# Patient Record
Sex: FEMALE | Race: WHITE | NOT HISPANIC OR LATINO | Employment: FULL TIME | URBAN - METROPOLITAN AREA
[De-identification: names, ages, dates, MRNs, and addresses within clinical notes are randomized per-mention and may not be internally consistent; named-entity substitution may affect disease eponyms.]

---

## 2018-05-30 ENCOUNTER — OFFICE VISIT (OUTPATIENT)
Dept: GASTROENTEROLOGY | Facility: MEDICAL CENTER | Age: 53
End: 2018-05-30

## 2018-05-30 VITALS
HEART RATE: 80 BPM | BODY MASS INDEX: 23.64 KG/M2 | WEIGHT: 156 LBS | HEIGHT: 68 IN | TEMPERATURE: 97.2 F | DIASTOLIC BLOOD PRESSURE: 80 MMHG | SYSTOLIC BLOOD PRESSURE: 140 MMHG

## 2018-05-30 DIAGNOSIS — K21.9 CHRONIC GERD: ICD-10-CM

## 2018-05-30 DIAGNOSIS — K62.5 RECTAL BLEEDING: Primary | ICD-10-CM

## 2018-05-30 PROCEDURE — 99244 OFF/OP CNSLTJ NEW/EST MOD 40: CPT | Performed by: INTERNAL MEDICINE

## 2018-05-30 RX ORDER — ASPIRIN 81 MG/1
81 TABLET ORAL DAILY
Status: ON HOLD | COMMUNITY
End: 2018-06-13 | Stop reason: ALTCHOICE

## 2018-05-30 RX ORDER — OMEPRAZOLE 40 MG/1
40 CAPSULE, DELAYED RELEASE ORAL DAILY
Qty: 30 CAPSULE | Refills: 2 | Status: SHIPPED | OUTPATIENT
Start: 2018-05-30

## 2018-05-30 RX ORDER — ATORVASTATIN CALCIUM 40 MG/1
40 TABLET, FILM COATED ORAL DAILY
COMMUNITY

## 2018-05-30 NOTE — LETTER
May 30, 2018     Referral [address] [place] [id number]    Patient: Rosalba Duran   YOB: 1965   Date of Visit: 5/30/2018       Dear Dr Rosa Bond:    Thank you for referring Rosalba Duran to me for evaluation  Below are my notes for this consultation  If you have questions, please do not hesitate to call me  I look forward to following your patient along with you  Sincerely,        Victorina Alejandre MD        CC: MD Victorina Womack MD  5/30/2018 11:34 AM  Sign at close encounter  [facility] - Outpatient Consultation  Rosalba Duran 47 y o  female MRN: [id number]  Encounter: [id number]          ASSESSMENT AND PLAN:      1  Rectal bleeding - likely secondary to hemorrhoids  Given her age and change in stool caliber, I recommend colonoscopy to rule out underlying malignancy and stricture  We reviewed high fiber diet  Avoid constipation  Follow up after her colonoscopy  2  GERD - She has reflux symptoms and decreased appetite  She is on ASA  Concern for NSAIDs induced gastritis and PUD  Trial of Omeprazole 40 mg po daily  Take 30 min before breakfast  If her symptoms persists consider EGD       ______________________________________________________________________    HPI:  47year old female with history of CAD s/p stent placement in 2012 here for evaluation of rectal bleeding  She reports intermittent bright red blood per rectum for 1 year  Over the last few months she noted increased in frequency to daily bleeding  She also reports having hemorrhoids and the blood is usually around the stool  Her stool consistency has also changed to thin caliber with sense of incomplete evacuation  She has bloating and discomfort in upper mid abdomen  She has intermittent reflux symptoms     Family history of GI malignancy none  Prior colonoscopy none  Prior EGD none  Prior abdominal surgery none    REVIEW OF SYSTEMS:    CONSTITUTIONAL: Denies any fever, chills, rigors, and weight loss  HEENT: No earache or tinnitus  Denies hearing loss or visual disturbances  CARDIOVASCULAR: No chest pain or palpitations  RESPIRATORY: Denies any cough, hemoptysis, shortness of breath or dyspnea on exertion  GASTROINTESTINAL: As noted in the History of Present Illness  GENITOURINARY: No problems with urination  Denies any hematuria or dysuria  NEUROLOGIC: No dizziness or vertigo, denies headaches  MUSCULOSKELETAL: Denies any muscle or joint pain  SKIN: Denies skin rashes or itching  ENDOCRINE: Denies excessive thirst  Denies intolerance to heat or cold  PSYCHOSOCIAL: Denies depression or anxiety  Denies any recent memory loss  Historical Information   Past Medical History:   Diagnosis Date    Anemia     Arthritis     GERD (gastroesophageal reflux disease)     Heart disease     Hyperlipidemia     Hypertension     Thyroid disease      Past Surgical History:   Procedure Laterality Date    CARDIAC SURGERY      TUBAL LIGATION       Social History   History   Alcohol Use    Yes     History   Drug Use No     History   Smoking Status    Never Smoker   Smokeless Tobacco    Never Used     Family History   Problem Relation Age of Onset    Heart disease Mother     Heart disease Father     Ulcerative colitis Sister     Ulcerative colitis Brother        Meds/Allergies       Current Outpatient Prescriptions:     aspirin (ECOTRIN LOW STRENGTH) 81 mg EC tablet    atorvastatin (LIPITOR) 40 mg tablet    metoprolol tartrate (LOPRESSOR) 25 mg tablet    Allergies   Allergen Reactions    Codeine            Objective     Blood pressure 140/80, pulse 80, temperature (!) 97 2 °F (36 2 °C), temperature source Tympanic, height 5' 8" (1 727 m), weight 70 8 kg (156 lb)          PHYSICAL EXAM:      General Appearance:   Alert, cooperative, no distress   HEENT:   Normocephalic, atraumatic, anicteric      Neck:  Supple, symmetrical, trachea midline   Lungs:   Clear to auscultation bilaterally; no rales, rhonchi or wheezing; respirations unlabored    Heart[de-identified]   Regular rate and rhythm; no murmur, rub, or gallop     Abdomen:   Soft,mild diffuse tenderness non-distended; normal bowel sounds; no masses, no organomegaly    Genitalia:   Deferred    Rectal:   Deferred - will be done during her colonoscopy   Extremities:  No cyanosis, clubbing or edema    Pulses:  2+ and symmetric    Skin:  No jaundice, rashes, or lesions    Lymph nodes:  No palpable cervical lymphadenopathy        Lab Results:  No [facility] labs

## 2018-05-30 NOTE — PROGRESS NOTES
[facility] - Outpatient Consultation  Bryan Wu 47 y o  female MRN: [id number]  Encounter: [id number]          ASSESSMENT AND PLAN:      1  Rectal bleeding - likely secondary to hemorrhoids  Given her age and change in stool caliber, I recommend colonoscopy to rule out underlying malignancy and stricture  We reviewed high fiber diet  Avoid constipation  Follow up after her colonoscopy  2  GERD - She has reflux symptoms and decreased appetite  She is on ASA  Concern for NSAIDs induced gastritis and PUD  Trial of Omeprazole 40 mg po daily  Take 30 min before breakfast  If her symptoms persists consider EGD       ______________________________________________________________________    HPI:  47year old female with history of CAD s/p stent placement in 2012 here for evaluation of rectal bleeding  She reports intermittent bright red blood per rectum for 1 year  Over the last few months she noted increased in frequency to daily bleeding  She also reports having hemorrhoids and the blood is usually around the stool  Her stool consistency has also changed to thin caliber with sense of incomplete evacuation  She has bloating and discomfort in upper mid abdomen  She has intermittent reflux symptoms  Family history of GI malignancy none  Prior colonoscopy none  Prior EGD none  Prior abdominal surgery none    REVIEW OF SYSTEMS:    CONSTITUTIONAL: Denies any fever, chills, rigors, and weight loss  HEENT: No earache or tinnitus  Denies hearing loss or visual disturbances  CARDIOVASCULAR: No chest pain or palpitations  RESPIRATORY: Denies any cough, hemoptysis, shortness of breath or dyspnea on exertion  GASTROINTESTINAL: As noted in the History of Present Illness  GENITOURINARY: No problems with urination  Denies any hematuria or dysuria  NEUROLOGIC: No dizziness or vertigo, denies headaches  MUSCULOSKELETAL: Denies any muscle or joint pain     SKIN: Denies skin rashes or itching  ENDOCRINE: Denies excessive thirst  Denies intolerance to heat or cold  PSYCHOSOCIAL: Denies depression or anxiety  Denies any recent memory loss  Historical Information   Past Medical History:   Diagnosis Date    Anemia     Arthritis     GERD (gastroesophageal reflux disease)     Heart disease     Hyperlipidemia     Hypertension     Thyroid disease      Past Surgical History:   Procedure Laterality Date    CARDIAC SURGERY      TUBAL LIGATION       Social History   History   Alcohol Use    Yes     History   Drug Use No     History   Smoking Status    Never Smoker   Smokeless Tobacco    Never Used     Family History   Problem Relation Age of Onset    Heart disease Mother     Heart disease Father     Ulcerative colitis Sister     Ulcerative colitis Brother        Meds/Allergies       Current Outpatient Prescriptions:     aspirin (ECOTRIN LOW STRENGTH) 81 mg EC tablet    atorvastatin (LIPITOR) 40 mg tablet    metoprolol tartrate (LOPRESSOR) 25 mg tablet    Allergies   Allergen Reactions    Codeine            Objective     Blood pressure 140/80, pulse 80, temperature (!) 97 2 °F (36 2 °C), temperature source Tympanic, height 5' 8" (1 727 m), weight 70 8 kg (156 lb)  PHYSICAL EXAM:      General Appearance:   Alert, cooperative, no distress   HEENT:   Normocephalic, atraumatic, anicteric      Neck:  Supple, symmetrical, trachea midline   Lungs:   Clear to auscultation bilaterally; no rales, rhonchi or wheezing; respirations unlabored    Heart[de-identified]   Regular rate and rhythm; no murmur, rub, or gallop     Abdomen:   Soft,mild diffuse tenderness non-distended; normal bowel sounds; no masses, no organomegaly    Genitalia:   Deferred    Rectal:   Deferred - will be done during her colonoscopy   Extremities:  No cyanosis, clubbing or edema    Pulses:  2+ and symmetric    Skin:  No jaundice, rashes, or lesions    Lymph nodes:  No palpable cervical lymphadenopathy        Lab Results:  No [facility] labs

## 2018-05-30 NOTE — PATIENT INSTRUCTIONS
Colonoscopy scheduled on 6/13/2018 (ok per Anson Chahal from [facility]) with Dr Lisbeth Peacock at the [facility]  Guillermina Mccarthy sample/ instructions giving to patient

## 2018-06-13 ENCOUNTER — HOSPITAL ENCOUNTER (OUTPATIENT)
Facility: HOSPITAL | Age: 53
Setting detail: OUTPATIENT SURGERY
Discharge: HOME | End: 2018-06-13
Attending: INTERNAL MEDICINE | Admitting: INTERNAL MEDICINE
Payer: COMMERCIAL

## 2018-06-13 ENCOUNTER — ANESTHESIA EVENT (OUTPATIENT)
Dept: GASTROENTEROLOGY | Facility: HOSPITAL | Age: 53
End: 2018-06-13
Payer: COMMERCIAL

## 2018-06-13 ENCOUNTER — ANESTHESIA (OUTPATIENT)
Dept: GASTROENTEROLOGY | Facility: HOSPITAL | Age: 53
End: 2018-06-13
Payer: COMMERCIAL

## 2018-06-13 VITALS
TEMPERATURE: 98 F | WEIGHT: 156 LBS | RESPIRATION RATE: 20 BRPM | DIASTOLIC BLOOD PRESSURE: 60 MMHG | OXYGEN SATURATION: 95 % | BODY MASS INDEX: 23.64 KG/M2 | SYSTOLIC BLOOD PRESSURE: 119 MMHG | HEART RATE: 69 BPM | HEIGHT: 68 IN

## 2018-06-13 DIAGNOSIS — K62.5 RECTAL BLEEDING: ICD-10-CM

## 2018-06-13 PROCEDURE — 45380 COLONOSCOPY AND BIOPSY: CPT | Performed by: INTERNAL MEDICINE

## 2018-06-13 PROCEDURE — 88305 TISSUE EXAM BY PATHOLOGIST: CPT | Performed by: PATHOLOGY

## 2018-06-13 RX ORDER — PROPOFOL 10 MG/ML
INJECTION, EMULSION INTRAVENOUS AS NEEDED
Status: DISCONTINUED | OUTPATIENT
Start: 2018-06-13 | End: 2018-06-13 | Stop reason: SURG

## 2018-06-13 RX ORDER — ASPIRIN 325 MG
325 TABLET ORAL DAILY
COMMUNITY

## 2018-06-13 RX ORDER — SODIUM CHLORIDE 9 MG/ML
50 INJECTION, SOLUTION INTRAVENOUS CONTINUOUS
Status: DISCONTINUED | OUTPATIENT
Start: 2018-06-13 | End: 2018-06-13 | Stop reason: HOSPADM

## 2018-06-13 RX ORDER — LIDOCAINE HYDROCHLORIDE 20 MG/ML
INJECTION, SOLUTION EPIDURAL; INFILTRATION; INTRACAUDAL; PERINEURAL AS NEEDED
Status: DISCONTINUED | OUTPATIENT
Start: 2018-06-13 | End: 2018-06-13 | Stop reason: SURG

## 2018-06-13 RX ADMIN — PROPOFOL 80 MG: 10 INJECTION, EMULSION INTRAVENOUS at 11:33

## 2018-06-13 RX ADMIN — PROPOFOL 50 MG: 10 INJECTION, EMULSION INTRAVENOUS at 11:38

## 2018-06-13 RX ADMIN — PROPOFOL 40 MG: 10 INJECTION, EMULSION INTRAVENOUS at 11:42

## 2018-06-13 RX ADMIN — PROPOFOL 40 MG: 10 INJECTION, EMULSION INTRAVENOUS at 11:40

## 2018-06-13 RX ADMIN — LIDOCAINE HYDROCHLORIDE 60 MG: 20 INJECTION, SOLUTION EPIDURAL; INFILTRATION; INTRACAUDAL; PERINEURAL at 11:33

## 2018-06-13 RX ADMIN — SODIUM CHLORIDE 50 ML/HR: 0.9 INJECTION, SOLUTION INTRAVENOUS at 11:04

## 2018-06-13 RX ADMIN — PROPOFOL 60 MG: 10 INJECTION, EMULSION INTRAVENOUS at 11:36

## 2018-06-13 RX ADMIN — PROPOFOL 40 MG: 10 INJECTION, EMULSION INTRAVENOUS at 11:35

## 2018-06-13 RX ADMIN — PROPOFOL 30 MG: 10 INJECTION, EMULSION INTRAVENOUS at 11:44

## 2018-06-13 NOTE — ANESTHESIA PREPROCEDURE EVALUATION
Review of Systems/Medical History  Patient summary reviewed  Chart reviewed  No history of anesthetic complications     Cardiovascular  Hyperlipidemia, Hypertension , Past MI , CAD (reports patent stent and non-obs CAD on f/u cath 2016) , Cardiac stents  No CHF ,    Pulmonary  Not a smoker , No asthma , No recent URI ,        GI/Hepatic    GERD ,        Kidney stones,        Endo/Other  History of thyroid disease , hypothyroidism,      GYN       Hematology  Anemia ,     Musculoskeletal    Arthritis     Neurology   Psychology           Physical Exam    Airway    Mallampati score: II  TM Distance: >3 FB       Dental       Cardiovascular      Pulmonary      Other Findings        Anesthesia Plan  ASA Score- 3     Anesthesia Type- IV sedation with anesthesia with ASA Monitors  Additional Monitors:   Airway Plan:     Comment: SENG Yeh , have personally seen and evaluated the patient prior to anesthetic care  I have reviewed the pre-anesthetic record, and other medical records if appropriate to the anesthetic care  If a CRNA is involved in the case, I have reviewed the CRNA assessment, if present, and agree  Risks/benefits and alternatives discussed with patient including possible PONV, sore throat, and possibility of rare anesthetic and surgical emergencies        Plan Factors- Patient instructed to abstain from smoking on day of procedure  Patient did not smoke on day of surgery  Induction-     Postoperative Plan-     Informed Consent- Anesthetic plan and risks discussed with patient  I personally reviewed this patient with the CRNA  Discussed and agreed on the Anesthesia Plan with the CRNA  Keely Sanderson

## 2018-06-13 NOTE — ANESTHESIA POSTPROCEDURE EVALUATION
Post-Op Assessment Note      CV Status:  Stable    Mental Status:  Alert and somnolent    Hydration Status:  Euvolemic    PONV Controlled:  Controlled    Airway Patency:  Patent    Post Op Vitals Reviewed: Yes          Staff: Anesthesiologist       Comments: vss, unobstructed respiratory pattern          BP      Temp      Pulse     Resp      SpO2

## 2018-06-13 NOTE — OP NOTE
**** GI/ENDOSCOPY REPORT ****     PATIENT NAME: FAYE DUMONT ------ VISIT ID:  Patient ID:   [id number] YOB: 1965     INTRODUCTION: Colonoscopy - A 47 female patient presents for an outpatient   Colonoscopy at [address] [place] [address]  PREVIOUS COLONOSCOPY: No prior colonoscopy  INDICATIONS: Rectal bleeding  CONSENT:  The benefits, risks, and alternatives to the procedure were   discussed and informed consent was obtained from the patient  PREPARATION: EKG, pulse, pulse oximetry and blood pressure were monitored   throughout the procedure  The patient was identified by myself both   verbally and by visual inspection of ID band  Airway Assessment   Classification: Airway class 2 - Visualization of the soft palate, fauces   and uvula  ASA Classification: Class 2 - Patient has mild to moderate   systemic disturbance that may or may not be related to the disorder   requiring surgery  MEDICATIONS: Anesthesia-check records     PROCEDURE:  The endoscope was passed without difficulty through the anus   under direct visualization and advanced to the cecum, confirmed by   appendiceal orifice and ileocecal valve  The scope was withdrawn and the   mucosa was carefully examined  The quality of the preparation was good  RECTAL EXAM: Normal rectal exam      FINDINGS:  Two flat polyps, measuring less than 5 mm in size, were found   in the ascending colon  All polyps were completely removed by cold biopsy   polypectomy  There was evidence of mild diverticulosis in the sigmoid   colon and descending colon  Internal hemorrhoids were found  COMPLICATIONS: There were no complications  IMPRESSIONS: Two flat polyps found in the ascending colon; all polyps   removed by cold biopsy polypectomy  Mild diverticulosis found in the   sigmoid colon and descending colon  Internal hemorrhoids       RECOMMENDATIONS: If pathology shows adenomatous polyp, Colonoscopy is recommended  in 5 years  Discharge home when standard parameters are met  Start high fiber diet  Follow-up on the results of the biopsy specimens  ESTIMATED BLOOD LOSS:     PATHOLOGY SPECIMENS: All polyps completely removed by cold biopsy   polypectomy  PROCEDURE CODES:     ICD-9 Codes: 569 3 Hemorrhage of rectum and anus 211 3 Benign neoplasm of   colon 562 10 Diverticulosis of colon (without mention of hemorrhage)     ICD-10 Codes: K62 5 Hemorrhage of anus and rectum K63 5 Polyp of colon K57   Diverticular disease of intestine K64 9 Unspecified hemorrhoids     PERFORMED BY: SENG Ruiz  on 06/13/2018  Version 1, electronically signed by SENG Briggs  on 06/13/2018   at 11:59

## 2018-06-13 NOTE — H&P (VIEW-ONLY)
[facility] - Outpatient Consultation  Candida Dimas 47 y o  female MRN: [id number]  Encounter: [id number]          ASSESSMENT AND PLAN:      1  Rectal bleeding - likely secondary to hemorrhoids  Given her age and change in stool caliber, I recommend colonoscopy to rule out underlying malignancy and stricture  We reviewed high fiber diet  Avoid constipation  Follow up after her colonoscopy  2  GERD - She has reflux symptoms and decreased appetite  She is on ASA  Concern for NSAIDs induced gastritis and PUD  Trial of Omeprazole 40 mg po daily  Take 30 min before breakfast  If her symptoms persists consider EGD       ______________________________________________________________________    HPI:  47year old female with history of CAD s/p stent placement in 2012 here for evaluation of rectal bleeding  She reports intermittent bright red blood per rectum for 1 year  Over the last few months she noted increased in frequency to daily bleeding  She also reports having hemorrhoids and the blood is usually around the stool  Her stool consistency has also changed to thin caliber with sense of incomplete evacuation  She has bloating and discomfort in upper mid abdomen  She has intermittent reflux symptoms  Family history of GI malignancy none  Prior colonoscopy none  Prior EGD none  Prior abdominal surgery none    REVIEW OF SYSTEMS:    CONSTITUTIONAL: Denies any fever, chills, rigors, and weight loss  HEENT: No earache or tinnitus  Denies hearing loss or visual disturbances  CARDIOVASCULAR: No chest pain or palpitations  RESPIRATORY: Denies any cough, hemoptysis, shortness of breath or dyspnea on exertion  GASTROINTESTINAL: As noted in the History of Present Illness  GENITOURINARY: No problems with urination  Denies any hematuria or dysuria  NEUROLOGIC: No dizziness or vertigo, denies headaches  MUSCULOSKELETAL: Denies any muscle or joint pain     SKIN: Denies skin rashes or itching  ENDOCRINE: Denies excessive thirst  Denies intolerance to heat or cold  PSYCHOSOCIAL: Denies depression or anxiety  Denies any recent memory loss  Historical Information   Past Medical History:   Diagnosis Date    Anemia     Arthritis     GERD (gastroesophageal reflux disease)     Heart disease     Hyperlipidemia     Hypertension     Thyroid disease      Past Surgical History:   Procedure Laterality Date    CARDIAC SURGERY      TUBAL LIGATION       Social History   History   Alcohol Use    Yes     History   Drug Use No     History   Smoking Status    Never Smoker   Smokeless Tobacco    Never Used     Family History   Problem Relation Age of Onset    Heart disease Mother     Heart disease Father     Ulcerative colitis Sister     Ulcerative colitis Brother        Meds/Allergies       Current Outpatient Prescriptions:     aspirin (ECOTRIN LOW STRENGTH) 81 mg EC tablet    atorvastatin (LIPITOR) 40 mg tablet    metoprolol tartrate (LOPRESSOR) 25 mg tablet    Allergies   Allergen Reactions    Codeine            Objective     Blood pressure 140/80, pulse 80, temperature (!) 97 2 °F (36 2 °C), temperature source Tympanic, height 5' 8" (1 727 m), weight 70 8 kg (156 lb)  PHYSICAL EXAM:      General Appearance:   Alert, cooperative, no distress   HEENT:   Normocephalic, atraumatic, anicteric      Neck:  Supple, symmetrical, trachea midline   Lungs:   Clear to auscultation bilaterally; no rales, rhonchi or wheezing; respirations unlabored    Heart[de-identified]   Regular rate and rhythm; no murmur, rub, or gallop     Abdomen:   Soft,mild diffuse tenderness non-distended; normal bowel sounds; no masses, no organomegaly    Genitalia:   Deferred    Rectal:   Deferred - will be done during her colonoscopy   Extremities:  No cyanosis, clubbing or edema    Pulses:  2+ and symmetric    Skin:  No jaundice, rashes, or lesions    Lymph nodes:  No palpable cervical lymphadenopathy        Lab Results:  No [facility] labs

## 2018-06-18 ENCOUNTER — TELEPHONE (OUTPATIENT)
Dept: GASTROENTEROLOGY | Facility: MEDICAL CENTER | Age: 53
End: 2018-06-18

## 2018-07-26 ENCOUNTER — TELEPHONE (OUTPATIENT)
Dept: GASTROENTEROLOGY | Facility: CLINIC | Age: 53
End: 2018-07-26

## 2018-07-26 NOTE — TELEPHONE ENCOUNTER
Dr Janis Robles pt    Please call the patient back to r/s her f/u for a Wednesday when the schedule is available

## 2020-10-20 ENCOUNTER — TELEPHONE (OUTPATIENT)
Dept: DERMATOLOGY | Facility: CLINIC | Age: 55
End: 2020-10-20

## 2025-04-11 ENCOUNTER — APPOINTMENT (OUTPATIENT)
Dept: RADIOLOGY | Facility: CLINIC | Age: 60
End: 2025-04-11
Attending: NURSE PRACTITIONER
Payer: COMMERCIAL

## 2025-04-11 ENCOUNTER — OFFICE VISIT (OUTPATIENT)
Dept: URGENT CARE | Facility: CLINIC | Age: 60
End: 2025-04-11
Payer: COMMERCIAL

## 2025-04-11 VITALS
DIASTOLIC BLOOD PRESSURE: 80 MMHG | HEART RATE: 90 BPM | SYSTOLIC BLOOD PRESSURE: 122 MMHG | RESPIRATION RATE: 16 BRPM | OXYGEN SATURATION: 97 % | TEMPERATURE: 98.1 F

## 2025-04-11 DIAGNOSIS — M79.674 TOE PAIN, RIGHT: Primary | ICD-10-CM

## 2025-04-11 DIAGNOSIS — M25.521 ELBOW PAIN, RIGHT: ICD-10-CM

## 2025-04-11 PROCEDURE — S9083 URGENT CARE CENTER GLOBAL: HCPCS | Performed by: NURSE PRACTITIONER

## 2025-04-11 PROCEDURE — G0383 LEV 4 HOSP TYPE B ED VISIT: HCPCS | Performed by: NURSE PRACTITIONER

## 2025-04-11 PROCEDURE — 73080 X-RAY EXAM OF ELBOW: CPT

## 2025-04-11 RX ORDER — VALACYCLOVIR HYDROCHLORIDE 1 G/1
1 TABLET, FILM COATED ORAL DAILY
COMMUNITY
Start: 2025-03-27
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
## 2025-07-10 ENCOUNTER — TELEPHONE (OUTPATIENT)
Age: 60
End: 2025-07-10

## 2025-07-10 NOTE — TELEPHONE ENCOUNTER
Pt calling to schedule stat barium swallow ENT doctor placed for her. I gave pt central scheduling ph # and transferred her.

## 2025-07-11 ENCOUNTER — CONSULT (OUTPATIENT)
Dept: GASTROENTEROLOGY | Facility: CLINIC | Age: 60
End: 2025-07-11
Attending: PHYSICIAN ASSISTANT
Payer: COMMERCIAL

## 2025-07-11 ENCOUNTER — HOSPITAL ENCOUNTER (OUTPATIENT)
Dept: RADIOLOGY | Facility: HOSPITAL | Age: 60
Discharge: HOME | End: 2025-07-11
Attending: PHYSICIAN ASSISTANT
Payer: COMMERCIAL

## 2025-07-11 VITALS
HEIGHT: 68 IN | SYSTOLIC BLOOD PRESSURE: 132 MMHG | BODY MASS INDEX: 21.07 KG/M2 | DIASTOLIC BLOOD PRESSURE: 70 MMHG | WEIGHT: 139 LBS

## 2025-07-11 DIAGNOSIS — R13.10 DYSPHAGIA, UNSPECIFIED TYPE: ICD-10-CM

## 2025-07-11 DIAGNOSIS — R07.0 THROAT PAIN: ICD-10-CM

## 2025-07-11 PROCEDURE — 99203 OFFICE O/P NEW LOW 30 MIN: CPT | Performed by: STUDENT IN AN ORGANIZED HEALTH CARE EDUCATION/TRAINING PROGRAM

## 2025-07-11 PROCEDURE — 74220 X-RAY XM ESOPHAGUS 1CNTRST: CPT

## 2025-07-11 RX ORDER — OMEPRAZOLE 40 MG/1
40 CAPSULE, DELAYED RELEASE ORAL EVERY MORNING
Qty: 90 CAPSULE | Refills: 1 | Status: SHIPPED | OUTPATIENT
Start: 2025-07-11 | End: 2026-01-07

## 2025-07-11 NOTE — PROGRESS NOTES
"Name: Majo Lafleur      : 1965      MRN: [id number]  Encounter Provider: Jose G Pedraza DO  Encounter Date: 2025   Encounter department: [facility]  :  Assessment & Plan  Dysphagia, unspecified type  Patient describes reflux and globus sensation.  Reassuringly NPL showed some erythema consistent with reflux and her esophagram was normal.  Continue omeprazole for 8 weeks and reassess in 3 to 4 months.  No GI contraindication to moving forward with her surgery.  Orders:    Ambulatory Referral to Gastroenterology    Throat pain  As above  Orders:    omeprazole (PriLOSEC) 40 MG capsule; Take 1 capsule (40 mg total) by mouth every morning      Assessment & Plan    History of Present Illness   History of Present Illness  59-year-old female with history of cervical myelopathy pending laminectomy at [facility] on Monday who presents for chronic globus sensation.  She saw ENT yesterday who did NPL and had erythema suggestive of reflux and was started on omeprazole.  She also has some intermittent chest discomfort.  She had an esophagram today which was normal.  Images personally reviewed with patient in the room.  History obtained from: patient  Review of Systems A complete review of systems is negative other than that noted above in the HPI.    Medications Ordered Prior to Encounter[1]   Social History[2]     Objective   /70   Ht 5' 8\" (1.727 m)   Wt 63 kg (139 lb)   BMI 21.13 kg/m²     Physical Exam  General Appearance: NAD, cooperative, alert  Eyes: Anicteric  GI:  Soft, non-tender, non-distended; normal bowel sounds; no masses, no organomegaly   Rectal: Deferred  Musculoskeletal: No edema.  Skin:     No jaundice      Results    Lab Results: I personally reviewed relevant lab results.                  [1]   Current Outpatient Medications on File Prior to Visit   Medication Sig Dispense Refill    aspirin 325 mg tablet Take 325 mg by mouth in the morning.   "    atorvastatin (LIPITOR) 40 mg tablet Take 40 mg by mouth in the morning.      metoprolol tartrate (LOPRESSOR) 25 mg tablet Take 12.5 mg by mouth every 12 (twelve) hours      valACYclovir (VALTREX) 1,000 mg tablet Take 1 tablet by mouth in the morning      [DISCONTINUED] omeprazole (PriLOSEC) 40 MG capsule Take 1 capsule (40 mg total) by mouth every morning 30 capsule 3     No current facility-administered medications on file prior to visit.   [2]   Social History  Tobacco Use    Smoking status: Never    Smokeless tobacco: Never   Substance and Sexual Activity    Alcohol use: Yes     Alcohol/week: 8.0 - 12.0 standard drinks of alcohol     Types: 4 - 6 Glasses of wine, 4 - 6 Standard drinks or equivalent per week     Comment: social    Drug use: No

## 2025-08-11 ENCOUNTER — OFFICE VISIT (OUTPATIENT)
Dept: GYNECOLOGY | Facility: CLINIC | Age: 60
End: 2025-08-11
Payer: COMMERCIAL